# Patient Record
Sex: FEMALE | Race: WHITE | NOT HISPANIC OR LATINO | Employment: OTHER | URBAN - METROPOLITAN AREA
[De-identification: names, ages, dates, MRNs, and addresses within clinical notes are randomized per-mention and may not be internally consistent; named-entity substitution may affect disease eponyms.]

---

## 2017-04-11 PROBLEM — E86.0 DEHYDRATION: Status: ACTIVE | Noted: 2017-04-11

## 2017-04-12 PROBLEM — R11.2 NAUSEA & VOMITING: Status: ACTIVE | Noted: 2017-04-12

## 2018-12-11 PROBLEM — R19.7 DIARRHEA: Status: ACTIVE | Noted: 2018-12-11

## 2018-12-11 PROBLEM — J84.10 PULMONARY FIBROSIS: Status: ACTIVE | Noted: 2018-12-11

## 2018-12-11 PROBLEM — J02.9 SORE THROAT: Status: ACTIVE | Noted: 2018-12-11

## 2018-12-13 PROBLEM — E03.9 HYPOTHYROIDISM: Status: ACTIVE | Noted: 2018-12-13

## 2018-12-16 PROBLEM — R94.30 ELEVATED LEFT VENTRICULAR END-DIASTOLIC PRESSURE (LVEDP): Status: ACTIVE | Noted: 2018-12-16

## 2019-01-16 PROBLEM — E86.0 DEHYDRATION: Status: RESOLVED | Noted: 2017-04-11 | Resolved: 2019-01-16

## 2019-02-27 ENCOUNTER — TELEPHONE (OUTPATIENT)
Dept: TRANSPLANT | Facility: CLINIC | Age: 69
End: 2019-02-27

## 2019-02-27 NOTE — LETTER
2019    Sebastian Godwin Jr.  [address]  [address]  [facility] [address]  Phone: [phone]  Fax: [phone]       Dear Dr. Sebastian Godwin Jr.,     Patient:  Heather Mena  MRN:  [id number]  :  1950    Thank you for referring Heather Mena to our lung transplant program.  Upon reviewing the medical records provided to our office, we find that Heather Mena is not a potential candidate for lung transplantation at [facility] due to her advanced age of 69.  Age cutoff for lung transplant consideration at [facility] is 66.    Heather Mena has the option of being referred by your office for lung transplant evaluation at another center.    Once again, we appreciate your referral to our center.  We look forward to working with you in the future.  If you have any questions or concerns regarding this decision, then please do not hesitate to contact me at [phone].    Sincerely,         Chinedu Mohan MD   Director, Lung Transplantation   Pulmonary & Critical Care Medicine    [facility]  [address] [place] [id number]  [phone]

## 2019-02-28 ENCOUNTER — TELEPHONE (OUTPATIENT)
Dept: TRANSPLANT | Facility: CLINIC | Age: 69
End: 2019-02-28

## 2019-02-28 NOTE — TELEPHONE ENCOUNTER
Notified Ms. Mena that she is denied for lung transplant consideration at [facility] due to her age. She had questions regarding referral to other Centers.  Advised her to discuss with her insurance providers and referring doctor to determine where she could be referred.   She verbalized understanding.    ----- Message from Pat Gomes sent at 2/28/2019 11:08 AM CST -----  Contact: patient  Attn Marisa    Please call pt at [phone] before 1pm    Patient would like to discuss her lung denial    Thank you

## 2019-06-18 PROBLEM — Z12.9 SPECIAL SCREENING FOR MALIGNANT NEOPLASM: Status: ACTIVE | Noted: 2019-06-18

## 2020-07-17 ENCOUNTER — TELEPHONE (OUTPATIENT)
Dept: ENDOCRINOLOGY | Facility: CLINIC | Age: 70
End: 2020-07-17

## 2020-07-17 NOTE — TELEPHONE ENCOUNTER
----- Message from Juany Topete sent at 7/17/2020  4:04 PM CDT -----  Contact: patient  Type: Needs Medical Advice  Who Called:  patient  Symptoms (please be specific):    How long has patient had these symptoms:    Pharmacy name and phone #:    Best Call Back Number: [phone]  Additional Information: requesting blood work order prior to upcoming visit be placed in epic and requesting a call back

## 2020-07-17 NOTE — TELEPHONE ENCOUNTER
Pt informed we cannot order labs as she is a new pt  Pt may get in touch with PCP to see if she can be treated and not come in to a specialist

## 2020-07-24 ENCOUNTER — OFFICE VISIT (OUTPATIENT)
Dept: ENDOCRINOLOGY | Facility: CLINIC | Age: 70
End: 2020-07-24
Payer: MEDICARE

## 2020-07-24 ENCOUNTER — TELEPHONE (OUTPATIENT)
Dept: ENDOCRINOLOGY | Facility: CLINIC | Age: 70
End: 2020-07-24

## 2020-07-24 VITALS
RESPIRATION RATE: 16 BRPM | DIASTOLIC BLOOD PRESSURE: 60 MMHG | BODY MASS INDEX: 21.71 KG/M2 | HEIGHT: 61 IN | WEIGHT: 115 LBS | SYSTOLIC BLOOD PRESSURE: 118 MMHG

## 2020-07-24 DIAGNOSIS — L65.9 HAIR LOSS: ICD-10-CM

## 2020-07-24 DIAGNOSIS — E03.9 HYPOTHYROIDISM, UNSPECIFIED TYPE: Primary | ICD-10-CM

## 2020-07-24 DIAGNOSIS — J84.10 PULMONARY FIBROSIS: ICD-10-CM

## 2020-07-24 DIAGNOSIS — I10 ESSENTIAL HYPERTENSION: ICD-10-CM

## 2020-07-24 PROCEDURE — 3008F PR BODY MASS INDEX (BMI) DOCUMENTED: ICD-10-PCS | Mod: CPTII,S$GLB,, | Performed by: INTERNAL MEDICINE

## 2020-07-24 PROCEDURE — 1101F PT FALLS ASSESS-DOCD LE1/YR: CPT | Mod: CPTII,S$GLB,, | Performed by: INTERNAL MEDICINE

## 2020-07-24 PROCEDURE — 1159F MED LIST DOCD IN RCRD: CPT | Mod: S$GLB,,, | Performed by: INTERNAL MEDICINE

## 2020-07-24 PROCEDURE — 99999 PR PBB SHADOW E&M-EST. PATIENT-LVL III: ICD-10-PCS | Mod: PBBFAC,,, | Performed by: INTERNAL MEDICINE

## 2020-07-24 PROCEDURE — 3008F BODY MASS INDEX DOCD: CPT | Mod: CPTII,S$GLB,, | Performed by: INTERNAL MEDICINE

## 2020-07-24 PROCEDURE — 3078F DIAST BP <80 MM HG: CPT | Mod: CPTII,S$GLB,, | Performed by: INTERNAL MEDICINE

## 2020-07-24 PROCEDURE — 1101F PR PT FALLS ASSESS DOC 0-1 FALLS W/OUT INJ PAST YR: ICD-10-PCS | Mod: CPTII,S$GLB,, | Performed by: INTERNAL MEDICINE

## 2020-07-24 PROCEDURE — 3074F SYST BP LT 130 MM HG: CPT | Mod: CPTII,S$GLB,, | Performed by: INTERNAL MEDICINE

## 2020-07-24 PROCEDURE — 99999 PR PBB SHADOW E&M-EST. PATIENT-LVL III: CPT | Mod: PBBFAC,,, | Performed by: INTERNAL MEDICINE

## 2020-07-24 PROCEDURE — 3074F PR MOST RECENT SYSTOLIC BLOOD PRESSURE < 130 MM HG: ICD-10-PCS | Mod: CPTII,S$GLB,, | Performed by: INTERNAL MEDICINE

## 2020-07-24 PROCEDURE — 1159F PR MEDICATION LIST DOCUMENTED IN MEDICAL RECORD: ICD-10-PCS | Mod: S$GLB,,, | Performed by: INTERNAL MEDICINE

## 2020-07-24 PROCEDURE — 1126F AMNT PAIN NOTED NONE PRSNT: CPT | Mod: S$GLB,,, | Performed by: INTERNAL MEDICINE

## 2020-07-24 PROCEDURE — 99204 OFFICE O/P NEW MOD 45 MIN: CPT | Mod: S$GLB,,, | Performed by: INTERNAL MEDICINE

## 2020-07-24 PROCEDURE — 1126F PR PAIN SEVERITY QUANTIFIED, NO PAIN PRESENT: ICD-10-PCS | Mod: S$GLB,,, | Performed by: INTERNAL MEDICINE

## 2020-07-24 PROCEDURE — 99204 PR OFFICE/OUTPT VISIT, NEW, LEVL IV, 45-59 MIN: ICD-10-PCS | Mod: S$GLB,,, | Performed by: INTERNAL MEDICINE

## 2020-07-24 PROCEDURE — 3078F PR MOST RECENT DIASTOLIC BLOOD PRESSURE < 80 MM HG: ICD-10-PCS | Mod: CPTII,S$GLB,, | Performed by: INTERNAL MEDICINE

## 2020-07-24 NOTE — ASSESSMENT & PLAN NOTE
Patient with significant pulmonary fibrosis.  She is ambulatory but as a limited capacity. Is on home oxygen.

## 2020-07-24 NOTE — PROGRESS NOTES
Subjective:    Patient ID:  Heather Mena is a 70 y.o. female.    Chief Complaint:  Hyperthyroidism      Pt presents to establish care for thyroid abnormalities.    PMH also significant for pulmonary fibrosis on home 02.     With regards to hypothyroidism. Onset was >5 years ago. PCP Dr. Atkinson was managing thyroid disease. Pt sought out second opinion on her own due to hair loss. Most recent TSH was suppressed 0.05 about 9 months ago. Notes changes in thyroid dose. Previously took 125, then thinks 100, now on 88 mcg since the most recent labs.      Current medication:  Generic levothyroxine 88 mcg. Takes thyroid medication properly without food first thing in the morning     No recent steroids.     Current symptoms:   hair loss +ve. Notes each time she peterson her hair has lots of hair in the comb.   Palpations +ve. Worse with walking.    No hoarseness, voice changes, dysphagia, compressive symptoms, or head/neck exposure to XRT. No personal or FH of thyroid cancer or MEN syndrome. No history of thyroid nodules.               Review of Systems   Constitutional: Positive for fatigue. Negative for unexpected weight change.   HENT: Negative for trouble swallowing and voice change.    Eyes: Negative for visual disturbance.   Respiratory: Positive for shortness of breath.    Cardiovascular: Negative for chest pain.   Gastrointestinal: Negative for abdominal pain and diarrhea.   Endocrine: Negative for cold intolerance and heat intolerance.   Musculoskeletal: Positive for myalgias.   Skin: Negative for wound.   Neurological: Positive for tremors. Negative for headaches.   Hematological: Negative for adenopathy.   Psychiatric/Behavioral: Negative for sleep disturbance.        Past Medical History:   Diagnosis Date    Diabetes mellitus     Hypertension     Pulmonary fibrosis     Thyroid disease       Social History     Tobacco Use    Smoking status: Never Smoker    Smokeless tobacco: Never Used   Substance Use  Topics    Alcohol use: No    Drug use: No     Family History   Problem Relation Age of Onset    Pulmonary fibrosis Brother     Heart disease Brother     Heart disease Father     Asthma Neg Hx     Emphysema Neg Hx       Past Surgical History:   Procedure Laterality Date    COLONOSCOPY N/A 6/18/2019    Procedure: COLONOSCOPY;  Surgeon: Tal Lucia Jr., MD;  Location: [facility] ENDO;  Service: Endoscopy;  Laterality: N/A;    CORONARY ANGIOGRAPHY N/A 12/13/2018    Procedure: ANGIOGRAM, CORONARY ARTERY;  Surgeon: Johnny Lau III, MD;  Location: [facility] CATH;  Service: Cardiology;  Laterality: N/A;    LEFT HEART CATHETERIZATION Left 12/13/2018    Procedure: Left heart cath- RM # [id number];  Surgeon: Johnny Lau III, MD;  Location: [facility] CATH;  Service: Cardiology;  Laterality: Left;          Current Outpatient Medications:     acetylcysteine (NAC) 600 mg Cap, Take 600 mg by mouth 3 (three) times daily., Disp: , Rfl:     albuterol sulfate 2.5 mg/0.5 mL Nebu, Take 2.5 mg by nebulization every 4 (four) hours as needed. Rescue, Disp: 30 each, Rfl: 0    ALPRAZolam (XANAX) 0.5 MG tablet, TAKE 1/2 TO 1 (ONE-HALF TO ONE) TABLET BY MOUTH THREE TIMES DAILY AS NEEDED FOR ANXIETY, Disp: 60 tablet, Rfl: 0    aspirin (ECOTRIN) 81 MG EC tablet, Take 81 mg by mouth once daily., Disp: , Rfl:     atorvastatin (LIPITOR) 10 MG tablet, Take 1 tablet (10 mg total) by mouth once daily., Disp: 90 tablet, Rfl: 1    benzonatate (TESSALON) 200 MG capsule, Take 1 capsule (200 mg total) by mouth 3 (three) times daily as needed for Cough., Disp: 30 capsule, Rfl: 3    cetirizine (ZYRTEC) 10 MG tablet, Take 10 mg by mouth once daily., Disp: , Rfl:     cholecalciferol, vitamin D3, (VITAMIN D3) 50 mcg (2,000 unit) Tab, Take 1 tablet by mouth once daily., Disp: , Rfl:     chromium picolinate 400 mcg Tab, Take 2 tablets by mouth once daily., Disp: , Rfl:     cinnamon bark 500 mg capsule, Take 500 mg by mouth once daily., Disp: , Rfl:      coenzyme Q10 (CO Q-10) 100 mg capsule, Take 100 mg by mouth once daily., Disp: , Rfl:     DULoxetine (CYMBALTA) 30 MG capsule, TAKE 1 CAPSULE EVERY DAY, Disp: 90 capsule, Rfl: 3    fluticasone (FLONASE) 50 mcg/actuation nasal spray, , Disp: , Rfl:     furosemide (LASIX) 20 MG tablet, Take 1 tablet (20 mg total) by mouth once daily., Disp: 90 tablet, Rfl: 1    garlic 3 mg Cap, Take by mouth., Disp: , Rfl:     Lactobacillus rhamnosus GG (CULTURELLE) 10 billion cell capsule, Take 1 capsule by mouth once daily., Disp: , Rfl:     levothyroxine (SYNTHROID) 88 MCG tablet, Take 1 tablet by mouth before breakfast., Disp: , Rfl:     linaGLIPtin (TRADJENTA) 5 mg Tab tablet, Take 5 mg by mouth once daily., Disp: , Rfl:     lisinopril 10 MG tablet, Take 10 mg by mouth once daily. , Disp: , Rfl:     loperamide (IMODIUM A-D) 2 mg Tab, Take 2 mg by mouth 4 (four) times daily as needed., Disp: , Rfl:     losartan (COZAAR) 50 MG tablet, Take 50 mg by mouth once daily., Disp: , Rfl:     metoprolol tartrate (LOPRESSOR) 25 MG tablet, Take 25 mg by mouth 2 (two) times daily. , Disp: , Rfl:     OFEV 100 mg Cap, TAKE 1 CAPSULE (100 MG) BY MOUTH TWICE A DAY, Disp: 60 capsule, Rfl: 11    omega 3-dha-epa-fish oil 1,200 (144-216) mg Cap, Take 2 capsules by mouth 2 (two) times daily., Disp: , Rfl:     ONE TOUCH DELICA LANCETS 33 gauge Misc, , Disp: , Rfl:     potassium chloride SA (K-DUR,KLOR-CON) 20 MEQ tablet, Take 2 tablets (40 mEq total) by mouth once daily., Disp: 10 tablet, Rfl: 0    TRELEGY ELLIPTA 100-62.5-25 mcg DsDv, Inhale 1 puff into the lungs once daily., Disp: , Rfl:     glimepiride (AMARYL) 2 MG tablet, Take 1 tablet (2 mg total) by mouth before breakfast., Disp: 90 tablet, Rfl: 3     Review of patient's allergies indicates:   Allergen Reactions    Amoxil [amoxicillin] Anaphylaxis    Neosporin [neomycin-bacitracnzn-polymyxnb] Swelling and Rash    Sulfur, elemental Hives and Itching    Doxycycline      "Levaquin [levofloxacin] Itching    Sulfa (sulfonamide antibiotics)         Objective:   /60   Resp 16   Ht 5' 1" (1.549 m)   Wt 52.2 kg (115 lb)   LMP  (LMP Unknown)   BMI 21.73 kg/m²   BP Readings from Last 3 Encounters:   07/24/20 118/60   03/12/20 112/66   02/25/20 (!) 99/46     Wt Readings from Last 3 Encounters:   07/24/20 1117 52.2 kg (115 lb)   03/12/20 1505 53.2 kg (117 lb 3.2 oz)   02/25/20 1621 52 kg (114 lb 10.2 oz)          Physical Exam  Vitals signs reviewed.   Constitutional:       General: She is not in acute distress.     Appearance: She is well-developed.      Comments: thin elderly lady sitting in wheelchair   HENT:      Head: Normocephalic and atraumatic.      Nose: Nose normal.   Eyes:      General: No scleral icterus.  Neck:      Thyroid: No thyromegaly.      Trachea: No tracheal deviation.      Comments: No thyromegaly or palpable thyroid nodule  Cardiovascular:      Rate and Rhythm: Normal rate and regular rhythm.      Heart sounds: Normal heart sounds. No murmur.   Pulmonary:      Effort: Pulmonary effort is normal. No respiratory distress.      Breath sounds: No stridor.   Abdominal:      General: Bowel sounds are normal.      Palpations: Abdomen is soft.   Musculoskeletal:         General: No swelling.   Lymphadenopathy:      Cervical: No cervical adenopathy.   Skin:     General: Skin is warm.      Comments: No abnormalities of anterior scab noted. No breaking of hairs noted.  Has noticeably less dense follicles in the anterior portion of her head.   Neurological:      Mental Status: She is alert and oriented to person, place, and time.      Cranial Nerves: No cranial nerve deficit.      Deep Tendon Reflexes: Reflexes normal.      Comments: No tremor of hands   Psychiatric:         Thought Content: Thought content normal.           Lab Results   Component Value Date     (L) 02/25/2020    K 3.2 (L) 02/25/2020     02/25/2020    CO2 24 02/25/2020    BUN 27 (H) " 02/25/2020    CREATININE 1.25 02/25/2020     (H) 02/25/2020    HGBA1C 6.3 (H) 12/11/2018    MG 2.0 10/04/2019    AST 33 02/25/2020    ALT 16 02/25/2020    ALBUMIN 3.7 02/25/2020    PROT 7.6 02/25/2020    BILITOT 0.6 02/25/2020    WBC 9.70 02/25/2020    HGB 14.5 02/25/2020    HCT 46.0 02/25/2020    MCV 94 02/25/2020    MCH 29.5 02/25/2020     02/25/2020    MPV 9.4 02/25/2020    GRAN 6.4 02/25/2020    GRAN 65.9 02/25/2020    LYMPH 2.3 02/25/2020    LYMPH 23.2 02/25/2020    CHOL 164 10/04/2019    HDL 59 10/04/2019    LDLCALC 89 10/04/2019    TRIG 69 10/04/2019       Lab Results   Component Value Date    TSH 0.05 (L) 10/04/2019    FREET4 1.81 05/27/2016        Thyroid Labs Latest Ref Rng & Units 1/14/2019 10/4/2019 2/25/2020   TSH 0.40 - 4.50 mIU/L - 0.05(L) -   Free T4 0.78 - 2.19 ng/dL - - -   Sodium 136 - 145 mmol/L 138 139 135(L)   Potassium 3.5 - 5.1 mmol/L 3.7 4.5 3.2(L)   Chloride 95 - 110 mmol/L 103 101 101   Carbon Dioxide 22 - 31 mmol/L 20 30 24   Glucose 70 - 110 mg/dL 169(H) 131(H) 161(H)   Blood Urea Nitrogen 7 - 18 mg/dL 42(H) 28(H) 27(H)   Creatinine 0.50 - 1.40 mg/dL 1.44(H) 0.85 1.25   Calcium 8.4 - 10.2 mg/dL 9.0 9.3 8.6   Total Protein 6.0 - 8.4 g/dL - 7.0 7.6   Albumin 3.5 - 5.2 g/dL - 3.7 3.7   Total Bilirubin 0.2 - 1.3 mg/dL - 0.7 0.6   AST 14 - 36 U/L - 21 33   ALT 0 - 35 U/L - 15 16   Anion Gap 8 - 16 mmol/L - - 10   eGFR (African American) >60 mL/min/1.73 m:2 43(L) 80 50(A)   eGFR (Non-African American) >60 mL/min/1.73 m:2 37(L) 69 44(A)   WBC 3.90 - 12.70 K/uL - 11.9(H) 9.70   RBC 4.00 - 5.40 M/uL - 4.78 4.91   Hemoglobin 12.0 - 16.0 g/dL - 14.6 14.5   Hematocrit 37.0 - 48.5 % - 43.3 46.0   MCV 82 - 98 fL - 90.6 94   MCH 27.0 - 31.0 pg - 30.5 29.5   MCHC 32.0 - 36.0 g/dL - 33.7 31.5(L)   RDW 11.5 - 14.5 % - 11.7 12.7   Platelets 150 - 350 K/uL - 274 182   MPV 9.2 - 12.9 fL - 9.8 9.4   Gran # 1.8 - 7.7 K/uL - - 6.4   Lymph # 1.0 - 4.8 K/uL - - 2.3   Mono # 0.3 - 1.0 K/uL - - 0.9    Eos # 0.0 - 0.5 K/uL - - 0.1   Baso # 0.00 - 0.20 K/uL - - 0.03   Gran % 38.0 - 73.0 % - - 65.9   Lymph % 18.0 - 48.0 % - - 23.2   Mono% 4.0 - 15.0 % - - 9.6   Eos % 0.0 - 8.0 % - - 0.5   Baso % 0.0 - 1.9 % - - 0.3   INR - - - -           Hemoglobin A1C   Date Value Ref Range Status   12/11/2018 6.3 (H) 0.0 - 5.6 % Final     Comment:     Reference Interval:  5.0 - 5.6 Normal   5.7 - 6.4 High Risk   > 6.5 Diabetic    Hgb A1c results are standardized based on the (NGSP) National   Glycohemoglobin Standardization Program.    Hemoglobin A1C levels are related to mean serum/plasma glucose   during the preceding 2-3 months.        04/11/2017 6.6 (H) 0.0 - 5.6 % Final     Comment:     Reference Interval:  5.0 - 5.6 Normal   5.7 - 6.4 High Risk   > 6.5 Diabetic    Hgb A1c results are standardized based on the (NGSP) National   Glycohemoglobin Standardization Program.    Hemoglobin A1C levels are related to mean serum/plasma glucose   during the preceding 2-3 months.        12/25/2016 8.0 (H) 0.0 - 5.6 % Final     Comment:     Reference Interval:  5.0 - 5.6 Normal   5.7 - 6.4 High Risk   > 6.5 Diabetic    Hgb A1c results are standardized based on the (NGSP) National   Glycohemoglobin Standardization Program.    Hemoglobin A1C levels are related to mean serum/plasma glucose   during the preceding 2-3 months.              Assessment and plan:       Problem List Items Addressed This Visit        Derm    Hair loss    Current Assessment & Plan     Unclear if symptoms are related to her thyroid.  However, explained that hyperthyroidism can lead to increased turnover of hair follicles andthus hair loss.  Will plan to recheck thyroid hormone levels and adjust medication as needed.  If thyroid levels within normal limits, would recommend PCP refer patient to derm for further follow-up.            Pulmonary    Pulmonary fibrosis    Current Assessment & Plan     Patient with significant pulmonary fibrosis.  She is ambulatory but as a  limited capacity. Is on home oxygen.             Cardiac/Vascular    Essential hypertension    Current Assessment & Plan     Blood pressure within normal limits.  Continue current meds.  Follow with PCP.            Endocrine    Hypothyroidism - Primary    Current Assessment & Plan     Most recent TSH was suppressed.  Will plan to recheck thyroid levels now and adjust dose as needed.         Relevant Orders    T3, free    TSH    T4, free            Labs now  RTC in 6 months

## 2020-07-24 NOTE — ASSESSMENT & PLAN NOTE
Most recent TSH was suppressed.  Will plan to recheck thyroid levels now and adjust dose as needed.

## 2020-07-24 NOTE — ASSESSMENT & PLAN NOTE
Unclear if symptoms are related to her thyroid.  However, explained that hyperthyroidism can lead to increased turnover of hair follicles and thus hair loss. Will plan to recheck thyroid hormone levels and adjust medication as needed.  If thyroid levels within normal limits, would recommend PCP refer patient to derm for further follow-up.

## 2021-01-10 ENCOUNTER — IMMUNIZATION (OUTPATIENT)
Dept: FAMILY MEDICINE | Facility: CLINIC | Age: 71
End: 2021-01-10
Payer: MEDICARE

## 2021-01-10 DIAGNOSIS — Z23 NEED FOR VACCINATION: ICD-10-CM

## 2021-01-10 PROCEDURE — 91300 COVID-19, MRNA, LNP-S, PF, 30 MCG/0.3 ML DOSE VACCINE: CPT | Mod: PBBFAC | Performed by: FAMILY MEDICINE

## 2021-01-17 PROBLEM — J96.01 ACUTE HYPOXEMIC RESPIRATORY FAILURE: Status: ACTIVE | Noted: 2021-01-17

## 2021-01-18 PROBLEM — E87.5 HYPERKALEMIA: Status: ACTIVE | Noted: 2021-01-18

## 2021-01-18 PROBLEM — Z71.89 ACP (ADVANCE CARE PLANNING): Status: ACTIVE | Noted: 2019-06-18

## 2021-01-19 PROBLEM — E87.5 HYPERKALEMIA: Status: RESOLVED | Noted: 2021-01-18 | Resolved: 2021-01-19

## 2021-01-20 PROBLEM — R53.81 DEBILITY: Status: ACTIVE | Noted: 2021-01-20

## 2021-01-31 ENCOUNTER — IMMUNIZATION (OUTPATIENT)
Dept: FAMILY MEDICINE | Facility: CLINIC | Age: 71
End: 2021-01-31
Payer: MEDICARE

## 2021-01-31 DIAGNOSIS — Z23 NEED FOR VACCINATION: Primary | ICD-10-CM

## 2021-01-31 PROCEDURE — 0002A COVID-19, MRNA, LNP-S, PF, 30 MCG/0.3 ML DOSE VACCINE: CPT | Mod: PBBFAC | Performed by: INTERNAL MEDICINE

## 2021-01-31 PROCEDURE — 91300 COVID-19, MRNA, LNP-S, PF, 30 MCG/0.3 ML DOSE VACCINE: CPT | Mod: PBBFAC | Performed by: INTERNAL MEDICINE
